# Patient Record
Sex: MALE | Race: BLACK OR AFRICAN AMERICAN | NOT HISPANIC OR LATINO | Employment: FULL TIME | URBAN - METROPOLITAN AREA
[De-identification: names, ages, dates, MRNs, and addresses within clinical notes are randomized per-mention and may not be internally consistent; named-entity substitution may affect disease eponyms.]

---

## 2018-03-16 ENCOUNTER — TELEPHONE (OUTPATIENT)
Dept: SURGERY | Facility: CLINIC | Age: 28
End: 2018-03-16

## 2018-03-16 NOTE — TELEPHONE ENCOUNTER
----- Message from Mireya Tinsley sent at 3/16/2018  3:46 PM CDT -----  Sorry, Left Inguinal Hernia, scanned in EPIC  3-16-18 1554  Called, not home, left message to call clinic.

## 2018-03-16 NOTE — TELEPHONE ENCOUNTER
----- Message from Viridiana Patton sent at 3/16/2018  4:30 PM CDT -----  Contact: Self [phone]  Patient Returning Your Phone Call  3-16-18 8744  Patient referred for Inguinal Hernia. Appointment offered for March 22 but patient declined. Appointment confirmed for March 26, 2018, appointment reminder mailed.

## 2018-03-26 ENCOUNTER — INITIAL CONSULT (OUTPATIENT)
Dept: SURGERY | Facility: CLINIC | Age: 28
End: 2018-03-26
Payer: MEDICAID

## 2018-03-26 VITALS
HEIGHT: 74 IN | WEIGHT: 152.31 LBS | OXYGEN SATURATION: 97 % | DIASTOLIC BLOOD PRESSURE: 60 MMHG | BODY MASS INDEX: 19.55 KG/M2 | HEART RATE: 80 BPM | TEMPERATURE: 99 F | SYSTOLIC BLOOD PRESSURE: 100 MMHG

## 2018-03-26 DIAGNOSIS — Z80.43 FAMILY HISTORY OF TESTICULAR CANCER: ICD-10-CM

## 2018-03-26 DIAGNOSIS — K40.91 UNILATERAL RECURRENT INGUINAL HERNIA WITHOUT OBSTRUCTION OR GANGRENE: Primary | ICD-10-CM

## 2018-03-26 DIAGNOSIS — Z87.438 H/O UNDESCENDED TESTICLE: ICD-10-CM

## 2018-03-26 DIAGNOSIS — R10.32 LEFT GROIN PAIN: ICD-10-CM

## 2018-03-26 DIAGNOSIS — K59.09 OTHER CONSTIPATION: ICD-10-CM

## 2018-03-26 PROCEDURE — 99214 OFFICE O/P EST MOD 30 MIN: CPT | Mod: PBBFAC,PN | Performed by: SURGERY

## 2018-03-26 PROCEDURE — 99204 OFFICE O/P NEW MOD 45 MIN: CPT | Mod: S$PBB,,, | Performed by: SURGERY

## 2018-03-26 PROCEDURE — 99999 PR PBB SHADOW E&M-EST. PATIENT-LVL IV: CPT | Mod: PBBFAC,,, | Performed by: SURGERY

## 2018-03-26 NOTE — PROGRESS NOTES
Subjective:      Patient ID: Cedric Vieira is a 27 y.o. male.    Chief Complaint: Hernia   27-year-old male presents with his girlfriend for evaluation of left groin pain he is concerning has a hernia.  He believes he had a left inguinal hernia repair as a child, at 4 years old.  He also states that there was an incision on his scrotum as well.  He is unsure if he had an undescended testes.  He asks about risks of testicular cancer and states that there is testicular cancer in his family.  He presents today for evaluation of possible hernia and states that the area overlying his previous incision can be intermittently tender.  He feels a bulge in the groin/scrotum occasionally after exertion.  This has happened several times over the past few years.  The last episode was several weeks ago.  When this occurs, he wears boxer briefs, relaxes and reduces after several days.  Has been taking Ultram prn pain, last  Dose 1 wk ago.  He has not had any recent imaging.  He does not smoke cigarettes but uses marijuana occasionally.    Past Medical History:   Diagnosis Date    Inguinal hernia      Past Surgical History:   Procedure Laterality Date    HERNIA REPAIR       History reviewed. No pertinent family history.  Social History     Social History    Marital status: Single     Spouse name: N/A    Number of children: N/A    Years of education: N/A     Social History Main Topics    Smoking status: Never Smoker    Smokeless tobacco: Never Used    Alcohol use Yes      Comment: occasional    Drug use: Yes     Types: Marijuana    Sexual activity: Yes     Partners: Female     Other Topics Concern    None     Social History Narrative    None       No current outpatient prescriptions on file.     No current facility-administered medications for this visit.      Review of patient's allergies indicates:  No Known Allergies    ROS:  All systems were reviewed and are negative, except that mentioned in the HPI.    Objective:  "    Vitals:    03/26/18 0839   BP: 100/60   BP Location: Left arm   Patient Position: Sitting   BP Method: Large (Manual)   Pulse: 80   Temp: 98.8 °F (37.1 °C)   SpO2: 97%   Weight: 69.1 kg (152 lb 4.8 oz)   Height: 6' 2" (1.88 m)     Physical Exam   Constitutional: He is oriented to person, place, and time. He appears well-developed and well-nourished. No distress.   HENT:   Head: Normocephalic and atraumatic.   Eyes: Conjunctivae and EOM are normal. Pupils are equal, round, and reactive to light. No scleral icterus.   Neck: Normal range of motion. Neck supple. No JVD present.   Cardiovascular: Normal rate and regular rhythm.    Pulmonary/Chest: Effort normal and breath sounds normal. No respiratory distress.   Abdominal: Soft. Bowel sounds are normal. He exhibits no distension and no mass. There is no tenderness. No hernia.   No obvious hernia palpated on physical exam.  A well-healed left inguinal incision is noted.   Genitourinary:   Genitourinary Comments: Well healed left scrotal incision is noted.  No obvious masses of the testes palpated.  2 small superficial skin cysts noted on the left side of the scrotum, these are nontender.   Musculoskeletal: Normal range of motion. He exhibits no edema or tenderness.   Neurological: He is alert and oriented to person, place, and time. No cranial nerve deficit.   Skin: Skin is warm and dry. He is not diaphoretic.   Psychiatric: He has a normal mood and affect.       Lab Review: CBC:   Lab Results   Component Value Date    WBC 13.02 (H) 12/14/2015    RBC 4.37 (L) 12/14/2015    HGB 14.6 12/14/2015    HCT 43.2 12/14/2015     12/14/2015     BMP:   Lab Results   Component Value Date    GLU 92 12/14/2015     12/14/2015    K 3.9 12/14/2015     12/14/2015    CO2 26 12/14/2015    BUN 9 12/14/2015    BUN 7 09/12/2015    CREATININE 0.90 12/14/2015    CALCIUM 9.1 12/14/2015     Lab Results   Component Value Date    ALT 29 12/14/2015    AST 23 12/14/2015    " ALKPHOS 66 12/14/2015    BILITOT 0.4 12/14/2015       Diagnostics Review:  CT neck 12/2015   Subcutaneous edema on the left consistent with cellulitis.    Assessment:     1. Unilateral recurrent inguinal hernia without obstruction or gangrene    2. Other constipation    3. Left groin pain    4. Family history of testicular cancer    5. H/O undescended testicle      Plan:   No obvious hernia palpated on physical exam.  Pathology of hernia and hernia handout was given to the patient.  Patient has a probable history of undescended left testes, family history of testicular cancer and left groins/scrotal discomfort.  An ultrasound of the left groin and testes will be ordered for further evaluation.  Consider referral to urology for surveillance.  All questions were answered.  He'll follow-up with us for the results of these tests.

## 2018-03-26 NOTE — LETTER
March 26, 2018      Cassia Ibrahim, NP  [address]  [facility] [address]  Phone: [phone]  Fax: [phone]          Patient: Cedric Vieira   MR Number: [id number]   YOB: 1990   Date of Visit: 3/26/2018       Dear Cassia Ibrahim:    Thank you for referring Cedric Vieira to me for evaluation. Attached you will find relevant portions of my assessment and plan of care.    If you have questions, please do not hesitate to call me. I look forward to following Cedric Vieira along with you.    Sincerely,    Eliza Abraham, DO    Enclosure  CC:  No Recipients    If you would like to receive this communication electronically, please contact [email or web address] or [phone] to request more information on [employer] Link access.    For providers and/or their staff who would like to refer a patient to [facility], please contact us through our one-stop-shop provider referral line, Kenrick Nichole, at [phone].    If you feel you have received this communication in error or would no longer like to receive these types of communications, please e-mail [email or web address]

## 2020-06-06 ENCOUNTER — HOSPITAL ENCOUNTER (EMERGENCY)
Facility: HOSPITAL | Age: 30
Discharge: HOME | End: 2020-06-06
Attending: EMERGENCY MEDICINE
Payer: MEDICAID

## 2020-06-06 VITALS
SYSTOLIC BLOOD PRESSURE: 141 MMHG | OXYGEN SATURATION: 99 % | BODY MASS INDEX: 20.28 KG/M2 | HEIGHT: 74 IN | TEMPERATURE: 99 F | DIASTOLIC BLOOD PRESSURE: 76 MMHG | HEART RATE: 88 BPM | WEIGHT: 158 LBS | RESPIRATION RATE: 16 BRPM

## 2020-06-06 DIAGNOSIS — N34.2 URETHRITIS: Primary | ICD-10-CM

## 2020-06-06 DIAGNOSIS — N30.00 ACUTE CYSTITIS WITHOUT HEMATURIA: ICD-10-CM

## 2020-06-06 DIAGNOSIS — Z20.2 POSSIBLE EXPOSURE TO STD: ICD-10-CM

## 2020-06-06 DIAGNOSIS — L03.011 CELLULITIS OF RIGHT THUMB: ICD-10-CM

## 2020-06-06 DIAGNOSIS — M79.643 HAND PAIN: ICD-10-CM

## 2020-06-06 DIAGNOSIS — M79.603 ARM PAIN: ICD-10-CM

## 2020-06-06 LAB
ALBUMIN SERPL BCP-MCNC: 4.6 G/DL (ref 3.5–5.2)
ALP SERPL-CCNC: 79 U/L (ref 38–126)
ALT SERPL W/O P-5'-P-CCNC: 30 U/L (ref 10–44)
ANION GAP SERPL CALC-SCNC: 8 MMOL/L (ref 8–16)
AST SERPL-CCNC: 39 U/L (ref 15–46)
BACTERIA #/AREA URNS AUTO: ABNORMAL /HPF
BASOPHILS # BLD AUTO: 0.05 K/UL (ref 0–0.2)
BASOPHILS NFR BLD: 0.4 % (ref 0–1.9)
BILIRUB SERPL-MCNC: 0.4 MG/DL (ref 0.1–1)
BILIRUB UR QL STRIP: NEGATIVE
BUN SERPL-MCNC: 11 MG/DL (ref 2–20)
CALCIUM SERPL-MCNC: 9.6 MG/DL (ref 8.7–10.5)
CHLORIDE SERPL-SCNC: 104 MMOL/L (ref 95–110)
CK SERPL-CCNC: 392 U/L (ref 55–170)
CLARITY UR REFRACT.AUTO: ABNORMAL
CO2 SERPL-SCNC: 24 MMOL/L (ref 23–29)
COLOR UR AUTO: YELLOW
CREAT SERPL-MCNC: 0.74 MG/DL (ref 0.5–1.4)
DIFFERENTIAL METHOD: ABNORMAL
EOSINOPHIL # BLD AUTO: 0.2 K/UL (ref 0–0.5)
EOSINOPHIL NFR BLD: 1.9 % (ref 0–8)
ERYTHROCYTE [DISTWIDTH] IN BLOOD BY AUTOMATED COUNT: 11.6 % (ref 11.5–14.5)
EST. GFR  (AFRICAN AMERICAN): >60 ML/MIN/1.73 M^2
EST. GFR  (NON AFRICAN AMERICAN): >60 ML/MIN/1.73 M^2
GLUCOSE SERPL-MCNC: 94 MG/DL (ref 70–110)
GLUCOSE UR QL STRIP: NEGATIVE
HCT VFR BLD AUTO: 39.4 % (ref 40–54)
HGB BLD-MCNC: 13.5 G/DL (ref 14–18)
HGB UR QL STRIP: ABNORMAL
IMM GRANULOCYTES # BLD AUTO: 0.05 K/UL (ref 0–0.04)
IMM GRANULOCYTES NFR BLD AUTO: 0.4 % (ref 0–0.5)
KETONES UR QL STRIP: NEGATIVE
LEUKOCYTE ESTERASE UR QL STRIP: ABNORMAL
LYMPHOCYTES # BLD AUTO: 2.1 K/UL (ref 1–4.8)
LYMPHOCYTES NFR BLD: 18.5 % (ref 18–48)
MCH RBC QN AUTO: 33.9 PG (ref 27–31)
MCHC RBC AUTO-ENTMCNC: 34.3 G/DL (ref 32–36)
MCV RBC AUTO: 99 FL (ref 82–98)
MICROSCOPIC COMMENT: ABNORMAL
MONOCYTES # BLD AUTO: 1 K/UL (ref 0.3–1)
MONOCYTES NFR BLD: 8.9 % (ref 4–15)
NEUTROPHILS # BLD AUTO: 7.8 K/UL (ref 1.8–7.7)
NEUTROPHILS NFR BLD: 69.9 % (ref 38–73)
NITRITE UR QL STRIP: NEGATIVE
NRBC BLD-RTO: 0 /100 WBC
PH UR STRIP: 7 [PH] (ref 5–8)
PLATELET # BLD AUTO: 313 K/UL (ref 150–350)
PMV BLD AUTO: 10 FL (ref 9.2–12.9)
POTASSIUM SERPL-SCNC: 4.2 MMOL/L (ref 3.5–5.1)
PROT SERPL-MCNC: 8.1 G/DL (ref 6–8.4)
PROT UR QL STRIP: ABNORMAL
RBC # BLD AUTO: 3.98 M/UL (ref 4.6–6.2)
RBC #/AREA URNS AUTO: 2 /HPF (ref 0–4)
SODIUM SERPL-SCNC: 136 MMOL/L (ref 136–145)
SP GR UR STRIP: 1.02 (ref 1–1.03)
URN SPEC COLLECT METH UR: ABNORMAL
UROBILINOGEN UR STRIP-ACNC: ABNORMAL EU/DL
WBC # BLD AUTO: 11.23 K/UL (ref 3.9–12.7)
WBC #/AREA URNS AUTO: 90 /HPF (ref 0–5)
WBC CLUMPS UR QL AUTO: ABNORMAL

## 2020-06-06 PROCEDURE — 87491 CHLMYD TRACH DNA AMP PROBE: CPT | Mod: ER

## 2020-06-06 PROCEDURE — 80053 COMPREHEN METABOLIC PANEL: CPT | Mod: ER

## 2020-06-06 PROCEDURE — 63700000 PHARM REV CODE 250 ALT 637 W/O HCPCS: Mod: ER | Performed by: PHYSICIAN ASSISTANT

## 2020-06-06 PROCEDURE — 87086 URINE CULTURE/COLONY COUNT: CPT | Mod: ER

## 2020-06-06 PROCEDURE — 99284 EMERGENCY DEPT VISIT MOD MDM: CPT | Mod: 25,ER

## 2020-06-06 PROCEDURE — 85025 COMPLETE CBC W/AUTO DIFF WBC: CPT | Mod: ER

## 2020-06-06 PROCEDURE — 25000003 PHARM REV CODE 250: Mod: ER | Performed by: PHYSICIAN ASSISTANT

## 2020-06-06 PROCEDURE — 96375 TX/PRO/DX INJ NEW DRUG ADDON: CPT | Mod: ER

## 2020-06-06 PROCEDURE — 81000 URINALYSIS NONAUTO W/SCOPE: CPT | Mod: ER

## 2020-06-06 PROCEDURE — 96365 THER/PROPH/DIAG IV INF INIT: CPT | Mod: ER

## 2020-06-06 PROCEDURE — 63600175 PHARM REV CODE 636 W HCPCS: Mod: ER | Performed by: PHYSICIAN ASSISTANT

## 2020-06-06 PROCEDURE — 82550 ASSAY OF CK (CPK): CPT | Mod: ER

## 2020-06-06 PROCEDURE — 63600175 PHARM REV CODE 636 W HCPCS: Mod: ER | Performed by: EMERGENCY MEDICINE

## 2020-06-06 RX ORDER — SULFAMETHOXAZOLE AND TRIMETHOPRIM 800; 160 MG/1; MG/1
1 TABLET ORAL 2 TIMES DAILY
Qty: 14 TABLET | Refills: 0 | Status: SHIPPED | OUTPATIENT
Start: 2020-06-06 | End: 2020-06-13

## 2020-06-06 RX ORDER — TRAMADOL HYDROCHLORIDE 50 MG/1
TABLET ORAL
Status: ON HOLD | COMMUNITY
End: 2023-03-15 | Stop reason: HOSPADM

## 2020-06-06 RX ORDER — ONDANSETRON 2 MG/ML
4 INJECTION INTRAMUSCULAR; INTRAVENOUS
Status: COMPLETED | OUTPATIENT
Start: 2020-06-06 | End: 2020-06-06

## 2020-06-06 RX ORDER — ONDANSETRON 4 MG/1
4 TABLET, ORALLY DISINTEGRATING ORAL
Status: DISCONTINUED | OUTPATIENT
Start: 2020-06-06 | End: 2020-06-06

## 2020-06-06 RX ORDER — DIPHENHYDRAMINE HYDROCHLORIDE 50 MG/ML
25 INJECTION INTRAMUSCULAR; INTRAVENOUS
Status: COMPLETED | OUTPATIENT
Start: 2020-06-06 | End: 2020-06-06

## 2020-06-06 RX ORDER — ACETAMINOPHEN 500 MG
1000 TABLET ORAL
Status: COMPLETED | OUTPATIENT
Start: 2020-06-06 | End: 2020-06-06

## 2020-06-06 RX ORDER — AZITHROMYCIN 250 MG/1
1000 TABLET, FILM COATED ORAL
Status: COMPLETED | OUTPATIENT
Start: 2020-06-06 | End: 2020-06-06

## 2020-06-06 RX ADMIN — ONDANSETRON 4 MG: 2 INJECTION INTRAMUSCULAR; INTRAVENOUS at 09:06

## 2020-06-06 RX ADMIN — ACETAMINOPHEN 1000 MG: 500 TABLET ORAL at 07:06

## 2020-06-06 RX ADMIN — AZITHROMYCIN MONOHYDRATE 1000 MG: 250 TABLET ORAL at 07:06

## 2020-06-06 RX ADMIN — DIPHENHYDRAMINE HYDROCHLORIDE 25 MG: 50 INJECTION, SOLUTION INTRAMUSCULAR; INTRAVENOUS at 09:06

## 2020-06-06 RX ADMIN — CEFTRIAXONE 1 G: 1 INJECTION, SOLUTION INTRAVENOUS at 07:06

## 2020-06-06 NOTE — ED PROVIDER NOTES
Encounter Date: 6/6/2020       History     Chief Complaint   Patient presents with    Arm Pain     c/o R arm pain s/p being shocked while working on car; also c/o dysuria with greenish discharge    Penile Discharge     HPI     Pt is a 29 y.o. male w/h/o *** , who presents for arm pain and penile dc.    Duration: ***  Quality: ***  Severity: ***  Location: ***  Radiation: ***  Improved by: ***  Worsened by: ***  Associated with: ***  Similar presentation? ***  Sick contacts/travel? ***      Review of patient's allergies indicates:  No Known Allergies  Past Medical History:   Diagnosis Date    Inguinal hernia      Past Surgical History:   Procedure Laterality Date    HERNIA REPAIR       No family history on file.  Social History     Tobacco Use    Smoking status: Never Smoker    Smokeless tobacco: Never Used   Substance Use Topics    Alcohol use: Yes     Comment: occasional    Drug use: Yes     Types: Marijuana     Review of Systems    Physical Exam     Initial Vitals [06/06/20 1856]   BP Pulse Resp Temp SpO2   (!) 163/91 85 20 98.6 °F (37 °C) 97 %      MAP       --         Physical Exam    ED Course   Procedures  Labs Reviewed - No data to display       Imaging Results    None                                          Clinical Impression:   {Add your Clinical Impression here. If you haven't documented one yet, please pend the note, finalize a Clinical Impression, and refresh your note before signing.:[id number]}

## 2020-06-07 NOTE — ED NOTES
Pt called on call bell states he was vomiting. Administered iv zofran for nausea. Pt arm began itching became red and broke out in hives. Benadryl administered itching has resolved hives starting to go down. zofran added to pt allergies

## 2020-06-07 NOTE — ED TRIAGE NOTES
Reports to ED c c/o R arm pain since today and penile discharge x 1 week.  Pt states he was working on a car and got shocked to the R arm. Swelling noted to R thumb and R pointer finger. Blister noted to thumb. Slight redness. Full ROM and strength. Pt also complaining of dysuria x 1 week. States burns c urination and slight green discharge. Denies N/V/D or fever.
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
## 2020-06-07 NOTE — DISCHARGE INSTRUCTIONS
Your x-rays did not reveal any evidence of fractures or dislocations.  Your found to have urinary tract infection.  You will be prescribed Bactrim for your urinary tract infection as well as for the cellulitis of your thumb.  Your gonorrhea chlamydia cultures are pending.  You are instructed to abstain from sex until your cultures report and all sexual partners are treated.  You are instructed to follow-up with your primary care provider for re-evaluation within 3 days.  You are instructed to return to the emergency department immediately for any new or worsening symptoms.

## 2020-06-08 LAB — BACTERIA UR CULT: NO GROWTH

## 2020-06-09 LAB
C TRACH DNA SPEC QL NAA+PROBE: NOT DETECTED
N GONORRHOEA DNA SPEC QL NAA+PROBE: DETECTED

## 2021-06-04 ENCOUNTER — HOSPITAL ENCOUNTER (EMERGENCY)
Facility: HOSPITAL | Age: 31
Discharge: HOME | End: 2021-06-04
Attending: SURGERY
Payer: MEDICAID

## 2021-06-04 VITALS
HEIGHT: 74 IN | DIASTOLIC BLOOD PRESSURE: 88 MMHG | SYSTOLIC BLOOD PRESSURE: 154 MMHG | BODY MASS INDEX: 23.1 KG/M2 | OXYGEN SATURATION: 98 % | WEIGHT: 180 LBS | HEART RATE: 108 BPM | RESPIRATION RATE: 20 BRPM | TEMPERATURE: 99 F

## 2021-06-04 DIAGNOSIS — F41.9 ANXIETY: Primary | ICD-10-CM

## 2021-06-04 PROCEDURE — 99283 EMERGENCY DEPT VISIT LOW MDM: CPT

## 2021-06-04 RX ORDER — LORAZEPAM 1 MG/1
1 TABLET ORAL
Status: DISCONTINUED | OUTPATIENT
Start: 2021-06-04 | End: 2021-06-04

## 2021-06-04 RX ORDER — DIPHENHYDRAMINE HCL 50 MG
50 CAPSULE ORAL ONCE
Status: DISCONTINUED | OUTPATIENT
Start: 2021-06-04 | End: 2021-06-04

## 2023-04-04 RX ORDER — PALIPERIDONE 3 MG/1
3 TABLET, EXTENDED RELEASE ORAL DAILY
Qty: 30 TABLET | Refills: 0 | Status: CANCELLED | OUTPATIENT
Start: 2023-04-04 | End: 2023-05-04

## 2023-05-14 ENCOUNTER — HOSPITAL ENCOUNTER (EMERGENCY)
Facility: HOSPITAL | Age: 33
Discharge: HOME | End: 2023-05-14
Attending: EMERGENCY MEDICINE
Payer: MEDICAID

## 2023-05-14 VITALS
OXYGEN SATURATION: 100 % | BODY MASS INDEX: 20.79 KG/M2 | HEART RATE: 90 BPM | WEIGHT: 162 LBS | TEMPERATURE: 99 F | SYSTOLIC BLOOD PRESSURE: 136 MMHG | RESPIRATION RATE: 20 BRPM | DIASTOLIC BLOOD PRESSURE: 81 MMHG | HEIGHT: 74 IN

## 2023-05-14 DIAGNOSIS — L29.9 PRURITUS: ICD-10-CM

## 2023-05-14 DIAGNOSIS — R20.2 PSYCHOGENIC FORMICATION: Primary | ICD-10-CM

## 2023-05-14 DIAGNOSIS — F54 PSYCHOGENIC FORMICATION: Primary | ICD-10-CM

## 2023-05-14 PROCEDURE — 25000003 PHARM REV CODE 250: Performed by: EMERGENCY MEDICINE

## 2023-05-14 PROCEDURE — 99284 EMERGENCY DEPT VISIT MOD MDM: CPT

## 2023-05-14 RX ORDER — HYDROXYZINE PAMOATE 25 MG/1
25 CAPSULE ORAL
Status: COMPLETED | OUTPATIENT
Start: 2023-05-14 | End: 2023-05-14

## 2023-05-14 RX ORDER — PALIPERIDONE 3 MG/1
3 TABLET, EXTENDED RELEASE ORAL DAILY
Qty: 30 TABLET | Refills: 0 | Status: SHIPPED | OUTPATIENT
Start: 2023-05-14 | End: 2023-05-14 | Stop reason: SDUPTHER

## 2023-05-14 RX ORDER — HYDROCORTISONE 25 MG/G
OINTMENT TOPICAL 2 TIMES DAILY
Qty: 20 G | Refills: 0 | Status: SHIPPED | OUTPATIENT
Start: 2023-05-14 | End: 2023-05-14 | Stop reason: SDUPTHER

## 2023-05-14 RX ORDER — HYDROCORTISONE 25 MG/G
OINTMENT TOPICAL 2 TIMES DAILY
Qty: 20 G | Refills: 0 | Status: SHIPPED | OUTPATIENT
Start: 2023-05-14 | End: 2023-05-21

## 2023-05-14 RX ORDER — DIPHENHYDRAMINE HCL 25 MG
25 CAPSULE ORAL
Status: DISCONTINUED | OUTPATIENT
Start: 2023-05-14 | End: 2023-05-14

## 2023-05-14 RX ORDER — PALIPERIDONE 3 MG/1
3 TABLET, EXTENDED RELEASE ORAL DAILY
Qty: 30 TABLET | Refills: 0 | Status: SHIPPED | OUTPATIENT
Start: 2023-05-14 | End: 2023-06-13

## 2023-05-14 RX ORDER — HYDROXYZINE HYDROCHLORIDE 25 MG/1
25 TABLET, FILM COATED ORAL 3 TIMES DAILY PRN
Qty: 21 TABLET | Refills: 0 | Status: SHIPPED | OUTPATIENT
Start: 2023-05-14 | End: 2023-05-21

## 2023-05-14 RX ORDER — HYDROXYZINE HYDROCHLORIDE 25 MG/1
25 TABLET, FILM COATED ORAL 3 TIMES DAILY PRN
Qty: 21 TABLET | Refills: 0 | Status: SHIPPED | OUTPATIENT
Start: 2023-05-14 | End: 2023-05-14 | Stop reason: SDUPTHER

## 2023-05-14 RX ADMIN — HYDROXYZINE PAMOATE 25 MG: 25 CAPSULE ORAL at 06:05

## 2023-05-14 NOTE — DISCHARGE INSTRUCTIONS
Mental Health Clinics:    [facility] ([place]) on Monday, Wednesday & Thursday 8:30 am-12:00 pm.   Appointments are first come, first served.  [address] [place] [address]  [phone].    [facility] Substance Abuse Services [address] [place] [address] [phone]  [facility] [address] [facility] [phone]   [facility] [address] [place] [address] [place] [phone]   [facility] [address] [facility]. [phone]   [facility] [address] [phone]   [facility] 34[address] [facility] [phone]   [facility] [address] [facility] [phone]   [facility] [address] [facility] [phone]   Alzheimer's Care Enrichment Program [phone]   Medicare and Medicaid Services [phone]     [place] Suicide Prevention Crisis Hotline: 786.135.7538    [facility] Outpatient Clinics:  1. [facility], [address] [facility] [place] [facility], [facility] , [phone]  2. [facility] [place], [address] [facility], [place], [phone]  3. [facility]: [address] [facility], [phone]  4. [facility], [address] [place] Margot [place] [phone]  5. [facility]: [address], [phone]  6. [facility], [address] [facility], [address], [phone]    Lists of hospitals in the [place] and Private Outpatient Clinics:  7. [facility], [address] [facility] 3rd floor, [facility], [phone], Moises Hussein, Anika   8. [facility], [address] [place] [address], [address] [facility], [place] [phone]  9. [facility]:: [address] [place] [address], [phone], [phone]  10. salgomed Rink[facility], [address] [facility], [address], [phone], Dr. Idania Storey, Chief Psychiatrist    [facility]  11. Cancer Support Group: [facility], [address] [facility] Wilver Saavedra, [phone]  12. Depression/Bipolar Support [place]: [facility], [address],  [place], 7:30pm  & 3rd Tues, Cafeteria, [phone]  13. Heart Transplant Support Group: . 7th Floor Conference Room, [facility], [phone], Sol Woodson  14. [facility] [place]: [facility] ([facility]) - [address] [place] [address] - Call [phone]  15. [facility], [facility] room 458, [address] Char Hickey RN, [phone]    [facility]:   16. Alcoholics Anonymous: AA at [facility], [address],  7pm, call [phone]  17. [facility]: [address] [facility], [place] [phone], Frank Corderopen ext [phone]  10am  18. [facility], [address], [place] [phone]  19. [facility]: Wan Shaikh MD, [phone]  20. [facility], [address], [place] [address], [phone]  21. [facility], [address] [facility], [address], [phone]    Inpatient Drug Rehab:  22. [facility]: [address] [facility], Males and Females, [phone]  23. [facility]),: [address] [facility], females only, [phone], ext [phone], contact Maria G Mills  24. [facility] [place] [address] [facility], [phone]  25. [facility]: [address], [place] [address], [phone]  26. [place], [address] [address] [place] [address], fee based: [phone]  27. [facility]: [phone]    Elderly Services:  28. Adult Protective Services: [phone]  29. Bereavement Support Group, [facility],  & 3rd Tues, [address] [facility], [phone], Mukul Myers, Ms Girish, therapists  30. Case Management for Sen[facility]- [address] [facility], [address], [place] [address] - Call [phone]   31. [facility] on Agin Tiffanie Zamorano, Daniela, [phone]  32. [facility] -. [address] [place], [place] [address] [phone] ext [phone]   33. [facility]:. - [address] [facility], [address], [place] [address] - Call [phone] or   34. [place] on Aging: Information on Aging Services - [facility] [facility], [address], [place] [address] - call [phone]    Thank you for choosing [facility]!     Our goal in the Emergency Department is to always provide outstanding medical care. You may receive a survey by mail or e-mail in the next week regarding your experience today. We would greatly appreciate you completing and returning the survey. Your feedback provides us with a way to recognize our staff who provide very good care, and it helps us learn how to improve when your experience was below our aspiration of excellence.      It is important to remember that some problems are difficult to diagnose and may not be found during your first visit. Be sure to follow up with your primary care doctor and review any labs/imaging that was performed during your visit with them. If you do not have a primary care doctor, you may contact the one listed on your discharge paperwork, or you may also call the [facility] Appointment Desk at [phone] to schedule an appointment.     All medications may potentially have side effects and it is impossible to predict which medications may give you side effects. If you feel that you are having a negative effect of any medication you should immediately stop taking them and seek medical attention.  Do not drive or make any important decisions for 24 hours if you have received any pain medications, sedatives or mood altering drugs during your ER visit.    We appreciate you trusting us with your medical care. We will be happy to take care of you for all of your future medical needs. You may return to the ER at any time for any new/concerning symptoms, worsening condition, or failure to improve. We hope you feel better soon.     Sincerely,    Doni Anderson Jr., MD  Board-Certified Emergency Medicine  Physician  [facility]

## 2023-05-14 NOTE — ED PROVIDER NOTES
Emergency Department Encounter  Provider Note    Cedric Vieira  [id number]  5/14/2023    Evaluation:    History:     Chief Complaint   Patient presents with    Insect Bite     Feels at though he has bugs crawling on his head. Unable to visualize in triage.        History of Present Illness:  Cedric Vieira is a 32 y.o. male who has a past medical history of Inguinal hernia.    The patient presents to the ED due to concern for bugs on his head.     Patient reports symptoms started a few days ago.   He states he is concerned the bugs are crawling in his hair, nose, and head. He is also concerned his car has bugs in it.  He denies any fever. No other close contacts with bugs.  He states he takes tramadol for chronic inguinal hernia pain about 3 times/month.   No other complaints or concerns.   He denies any SI/HI or any other medical complaints.  He is requesting a cream for the itching and to be discharged so he can return to work.    Past Medical History:   Diagnosis Date    Inguinal hernia      Past Surgical History:   Procedure Laterality Date    HERNIA REPAIR       No family history on file.  Social History     Socioeconomic History    Marital status: Single   Tobacco Use    Smoking status: Never    Smokeless tobacco: Never   Substance and Sexual Activity    Alcohol use: Yes     Comment: occasional    Drug use: Yes     Types: Marijuana    Sexual activity: Yes     Partners: Female     Social Determinants of Health     Financial Resource Strain: Low Risk     Difficulty of Paying Living Expenses: Not hard at all   Food Insecurity: No Food Insecurity    Worried About Running Out of Food in the Last Year: Never true    Ran Out of Food in the Last Year: Never true   Transportation Needs: No Transportation Needs    Lack of Transportation (Medical): No    Lack of Transportation (Non-Medical): No   Physical Activity: Sufficiently Active    Days of Exercise per Week: 5 days    Minutes of Exercise per Session: 60 min   Stress:  Stress Concern Present    Feeling of Stress : Very much   Social Connections: Moderately Integrated    Frequency of Communication with Friends and Family: More than three times a week    Frequency of Social Gatherings with Friends and Family: More than three times a week    Attends Voodoo Services: More than 4 times per year    Active Member of Clubs or Organizations: Yes    Attends Club or Organization Meetings: More than 4 times per year    Marital Status: Never    Housing Stability: Low Risk     Unable to Pay for Housing in the Last Year: No    Number of Places Lived in the Last Year: 1    Unstable Housing in the Last Year: No     Review of patient's allergies indicates:   Allergen Reactions    Zofran [ondansetron hcl] Hives       Review of Systems   Skin:  Negative for rash and wound.     Physical Exam:     Initial Vitals [05/14/23 1737]   BP Pulse Resp Temp SpO2   136/81 90 20 98.7 °F (37.1 °C) 100 %      MAP       --         Physical Exam    Nursing note and vitals reviewed.  Constitutional: He appears well-developed and well-nourished. He is not diaphoretic. No distress.   HENT:   Head: Normocephalic and atraumatic.   Mouth/Throat: Oropharynx is clear and moist.   Eyes: EOM are normal. Pupils are equal, round, and reactive to light.   Neck: No tracheal deviation present.   Cardiovascular:  Normal rate, regular rhythm, normal heart sounds and intact distal pulses.           Pulmonary/Chest: Breath sounds normal. No stridor. No respiratory distress.   Abdominal: Abdomen is soft. He exhibits no distension and no mass. There is no abdominal tenderness.   Musculoskeletal:         General: No edema. Normal range of motion.     Neurological: He is alert and oriented to person, place, and time. No cranial nerve deficit or sensory deficit.   Skin: Skin is warm and dry. Capillary refill takes less than 2 seconds. No rash noted.   No bugs on skin. No rash or open wounds.    Psychiatric: His speech is normal  and behavior is normal. Judgment normal. His mood appears anxious. He is not actively hallucinating. Thought content is delusional. Cognition and memory are normal.   Delusions of infestation.  He is attentive.       ED Course:   Procedures    Medical Decision Making:    History Acquisition:   Additional historians utilized:  none    Prior medical records were reviewed:   Seen in ED earlier today for similar symptoms. Discharged with Psychiatry f/u.   Prior Psych admission 3/14-3/15 for acute psychosis.   Prior ED visit 07/2021 for formication with cellulitis.     The patient's list of active medical problems, social history, medications, and allergies as documented has been reviewed.     Differential Diagnoses:   Based on available information and initial assessment, Differential Diagnosis includes, but is not limited to:  Decompensated psychiatric disease (schizophrenia, bipolar disorder, major depression), excited delirium, medication noncompliance, substance abuse/withdrawal, intentional drug overdose, medication toxicity, APAP/ASA overdose, acute stress reaction, personality disorder, malingering, metabolic derangement        EKG:       Labs:   Labs Reviewed - No data to display  Independent review of the labs ordered include:   See ED course    Imaging:     Imaging Results    None            Additional Consideration:   Additional testing considered during clinical course: none    Social determinants of health considered during development of treatment plan include: poor access to care, tramadol use    Current co-morbidities considered which impacted clinical decision making: none    Case discussed with additional provider: none    Medications   hydrOXYzine pamoate capsule 25 mg (25 mg Oral Given 5/14/23 1807)             Medical Decision Making:   Initial Assessment:   33 yo M with concern for bugs on his skin and head.  Vitals and exam reassuring.  No bugs visualized on exam.  Patient with apparent single  fixed delusion of infestation.   No SI/HI. Not gravely disabled.   Patient counseled that symptoms may be related to tramadol use vs primary psychiatric disorder.   Patient is comfortable with OP management including symptomatic care for itching. Will refill paliperidone as well.  Provided with OP PCP and Psych resources. Stable for D/C.       On re-evaluation, the patient's status has improved.  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  PSP/Psych follow-up within 2-3 days was recommended.    After taking into careful account the patient's history, physical exam findings, as well as empirical and objective data obtained throughout ED workup, I feel no emergent medical condition has been identified. No further evaluation or admission was felt to be required, and the patient is stable for discharge from the ED. The patient and any additional family present were updated with test results, overall clinical impression, and recommended further plan of care, including discharge instructions as provided and outpatient follow-up for continued evaluation and management as needed. All questions were answered. The patient expressed understanding and agreed with current plan for discharge and follow-up plan of care. Strict ED return precautions were provided, including return/worsening of current symptoms, new symptoms, or any other concerns.                 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 Pruritus  L29.9 698.9       Discharge Medications:  Current Discharge Medication List        START taking these medications    Details   hydrocortisone 2.5 % ointment Apply topically 2 (two) times daily. for 7 days  Qty: 20 g, Refills: 0      hydrOXYzine HCL (ATARAX) 25 MG tablet Take 1 tablet (25 mg total) by mouth 3 (three) times daily as needed for Itching.  Qty: 21 tablet, Refills: 0               Follow-up  Information       Follow up With Specialties Details Why Contact Info Additional Information    [facility] Medicine Family Medicine Schedule an appointment as soon as possible for a visit   [address] [facility] [address], [address]  [facility] [address] [phone] Please park in Lot C or D and use Albert govea. Take Medical Office Bldg. elevators.             ED Disposition Condition    Discharge Stable               Doni Anderson MD  05/14/23 2134

## 2023-05-16 ENCOUNTER — PATIENT OUTREACH (OUTPATIENT)
Dept: EMERGENCY MEDICINE | Facility: HOSPITAL | Age: 33
End: 2023-05-16
Payer: MEDICAID

## 2023-05-16 NOTE — PROGRESS NOTES
Lucía Ruff  ED Navigator  Emergency Department    Project: [facility] ED Navigator  Role: Community Health Worker    Date: 05/16/2023  Patient Name: Cedric Vieira  MRN: [id number]  PCP: Primary Doctor No    Assessment:     Cedric Vieira is a 32 y.o. male who has presented to ED for psychogenic formication. Patient has visited the ED 3 times in the past 3 months. Patient did not contact PCP.     ED Navigator Initial Assessment    ED Navigator Enrollment Documentation  Consent to Services  Does patient consent to completing the assessment?: Yes  Contact  Method of Initial Contact: Phone  Transportation  Does the patient have issues with Transportation?: No  Does the patient have transportation to and from healthcare appointments?: Yes  Insurance Coverage  Do you have coverage/adequate coverage?: Yes  [place] of [place] [employer]  Is patient able to afford co-pays/deductibles?: Yes  Is patient able to afford HME or supplies?: Yes  Does patient have an established [facility] PCP?: No  Does patient need assistance finding a PCP?: Yes  Does the patient have a lack of adequate coverage?: No  Specialist Appointment  Did the patient come to the ED to see a specialist?: No  Does the patient have a pending specialist referral?: No  Does the patient have a specialist appointment made?: No  PCP Follow Up Appointment  Has the patient had an appointment with a primary care provider in the past year?: No  Does the patient have a follow up appontment with a PCP?: No  Why does the patient not have a follow up scheduled?: No established [facility]/outside PCP  Would you like an [facility] PCP?: Yes  Medications  Is patient able to afford medication?: Yes  Is patient unable to get medication due to lack of transportation?: No  Psychological  Does the patient have psycho-social concerns?: Yes  What concerns does the patient have?: Mental health  Food  Does the patient have concerns about food?: No  Communication/Education  Does the patient  have limited English proficiency/English not primary language?: No  Does patient have low literacy and/or low health literacy?: Yes  Does patient have concerns with care?: No  Does patient have dissatisfaction with care?: No  Other Financial Concerns  Does the patient have immediate financial distress?: No  Does the patient have general financial concerns?: No  Other Social Barriers/Concerns  Does the patient have any additional barriers or concerns?: None  Primary Barrier  Barriers identified: Cognitive barrier (health literacy, language and communication, etc.)  Root Cause of ED Utilization: Patient Knowledge/Low Health Literacy  Plan to address Patient Knowledge/Low Health Literacy: Provided information for [facility] 24/7 Nurse triage line [phone] or [facility] ([phone])  Next steps: Provided Education, Scheduled Appointment/Referral  Scheduled Appointment Date: 6/22/23  Appointment Reminder Date: 6/21/23  Was education/educational materials provided surrounding PCP services/creating a medical home?: Yes Was education verbal or written?: Written     Was education/educational materials provided surrounding low cost, healthy foods?: Yes Was education verbal or written?: Written     Was education/educational materials provided surrounding other items? If so, use comment to explain.: Yes Was education verbal or written?: Written   Plan: Provided information for [facility] On Call 24/7 Nurse triage line, [phone] or [facility] ([phone])  Expected Date of Follow Up 1: 6/21/23         Social History     Socioeconomic History    Marital status: Single   Tobacco Use    Smoking status: Never    Smokeless tobacco: Never   Substance and Sexual Activity    Alcohol use: Yes     Comment: occasional    Drug use: Yes     Types: Marijuana    Sexual activity: Yes     Partners: Female     Social Determinants of Health     Financial Resource Strain: Low Risk     Difficulty of Paying Living Expenses: Not  hard at all   Food Insecurity: No Food Insecurity    Worried About Running Out of Food in the Last Year: Never true    Ran Out of Food in the Last Year: Never true   Transportation Needs: No Transportation Needs    Lack of Transportation (Medical): No    Lack of Transportation (Non-Medical): No   Physical Activity: Sufficiently Active    Days of Exercise per Week: 5 days    Minutes of Exercise per Session: 60 min   Stress: Stress Concern Present    Feeling of Stress : Very much   Social Connections: Moderately Integrated    Frequency of Communication with Friends and Family: More than three times a week    Frequency of Social Gatherings with Friends and Family: More than three times a week    Attends Judaism Services: More than 4 times per year    Active Member of Clubs or Organizations: Yes    Attends Club or Organization Meetings: More than 4 times per year    Marital Status: Never    Housing Stability: Low Risk     Unable to Pay for Housing in the Last Year: No    Number of Places Lived in the Last Year: 1    Unstable Housing in the Last Year: No       Plan:   Patient agreed with enrollment and F/U calls. Called [facility] and scheduled PCP appt with Dr Cb Keyes on 06/22 at 1:20. Patient denies needing resources for transportation, food, housing, utilities, smoking cessation, and anxiety/stress/depression. Emailed appt info to patient along with [facility]IP documents (Right Care Right Place form, [place] Virtual Visit Flyer, [facility] P[facility] scheduling assistance, [facility] on call RN #, and Heart Healthy Diet education). Will F/U with pt on  06/21

## 2023-06-21 ENCOUNTER — PATIENT OUTREACH (OUTPATIENT)
Dept: EMERGENCY MEDICINE | Facility: HOSPITAL | Age: 33
End: 2023-06-21
Payer: MEDICAID

## 2023-06-21 NOTE — PROGRESS NOTES
ED Navigator reminded the patient of scheduled appointment with Dr Cb Keyes on 06/22/23 at 1:20. Patient instructed to bring a photo I.D., health insurance card, and a list of current medications. Patient agreed to appointment date and time. Patient declined additional services. Follow up encounter closed.

## 2023-08-08 ENCOUNTER — PATIENT OUTREACH (OUTPATIENT)
Dept: EMERGENCY MEDICINE | Facility: HOSPITAL | Age: 33
End: 2023-08-08
Payer: MEDICAID

## 2023-09-08 ENCOUNTER — PATIENT OUTREACH (OUTPATIENT)
Dept: EMERGENCY MEDICINE | Facility: HOSPITAL | Age: 33
End: 2023-09-08
Payer: MEDICAID

## 2023-10-09 ENCOUNTER — PATIENT OUTREACH (OUTPATIENT)
Dept: EMERGENCY MEDICINE | Facility: HOSPITAL | Age: 33
End: 2023-10-09
Payer: MEDICAID